# Patient Record
Sex: MALE | Race: WHITE | Employment: STUDENT | URBAN - METROPOLITAN AREA
[De-identification: names, ages, dates, MRNs, and addresses within clinical notes are randomized per-mention and may not be internally consistent; named-entity substitution may affect disease eponyms.]

---

## 2021-06-28 ENCOUNTER — TELEPHONE (OUTPATIENT)
Dept: SURGERY | Age: 21
End: 2021-06-28

## 2021-06-28 NOTE — TELEPHONE ENCOUNTER
Called PT. To remind him of tomorrows appointment and to arrive 30 minutes early to complete NP paperwork. A male figured answered the phone and I ask to speak with Andreea Rogers. The male figure said  \"im sorry you have the wrong number\".

## 2021-06-29 ENCOUNTER — OFFICE VISIT (OUTPATIENT)
Dept: SURGERY | Age: 21
End: 2021-06-29
Payer: COMMERCIAL

## 2021-06-29 VITALS
DIASTOLIC BLOOD PRESSURE: 66 MMHG | HEART RATE: 74 BPM | WEIGHT: 210.5 LBS | RESPIRATION RATE: 18 BRPM | BODY MASS INDEX: 24.35 KG/M2 | TEMPERATURE: 98.4 F | HEIGHT: 78 IN | SYSTOLIC BLOOD PRESSURE: 130 MMHG | OXYGEN SATURATION: 98 %

## 2021-06-29 DIAGNOSIS — S39.011A STRAIN OF ABDOMINAL WALL, INITIAL ENCOUNTER: ICD-10-CM

## 2021-06-29 PROCEDURE — 99203 OFFICE O/P NEW LOW 30 MIN: CPT | Performed by: SURGERY

## 2021-06-29 NOTE — PROGRESS NOTES
Surgery History and Physical    Subjective:      Sandra Lopez is a 22 y.o. white male who presents for evaluation of abdominal wall pain, possible hernia. A couple of months ago, Mr. Julia Ball developed pain in his RLQ abdominal wall after lifting weights. The pain has been recurrent, and is exacerbated by straining. Initially, he thought he noticed a bulge which has since resolved. He is eating fine and moving his bowels and urinating normally. He denies any previous hernia repairs or abdominal procedures. He was seen by Victorina Garcia and told that he had an inguinal hernia. He was then seen by Dr. Aubree Briscoe and told that he did not have a hernia, but rather an abdominal wall muscle strain. Of note, he is a  for the [employer] in [facility]. No past medical history on file. No past surgical history on file. No family history on file. Social History     Tobacco Use    Smoking status: Never Smoker    Smokeless tobacco: Never Used   Substance Use Topics    Alcohol use: Yes     Comment: rare occasions      Prior to Admission medications    Not on File      Allergies   Allergen Reactions    Penicillins Rash       Review of Systems:  A comprehensive review of systems was negative except for that written in the History of Present Illness. Objective:      Physical Exam:  GENERAL: alert, cooperative, no distress, appears stated age, EYE: negative findings: anicteric sclera, LYMPHATIC: Cervical, supraclavicular, and axillary nodes normal. , THROAT & NECK: normal, LUNG: clear to auscultation bilaterally, HEART: regular rate and rhythm, ABDOMEN: Soft, NT, ND., GROIN: On the left, there is no hernia. On the right, there is no hernia. Genitalia is grossly normal.  Testicles are descended bilaterally. , EXTREMITIES:  no edema, SKIN: Normal., NEUROLOGIC: negative, PSYCHIATRIC: non focal    Assessment:     RLQ abdominal wall muscle strain.     Plan:     Mr. Julia Ball does not have a hernia by my exam today. If he wants to pursue any further testing, then a CT of the abdominal wall would be the next step. He will call back if he decides to do this. Otherwise, he can try to rest the area since he is not interested in physical therapy as ordered by Dr. Bibiana Brito. He can f/u with me prn.

## 2021-06-29 NOTE — PROGRESS NOTES
1. Have you been to the ER, urgent care clinic since your last visit? Hospitalized since your last visit? No    2. Have you seen or consulted any other health care providers outside of the [address] [place] [address] since your last visit? Include any pap smears or colon screening.  No

## 2022-03-19 PROBLEM — S39.011A ABDOMINAL WALL STRAIN: Status: ACTIVE | Noted: 2021-06-29
